# Patient Record
Sex: FEMALE | Race: WHITE | NOT HISPANIC OR LATINO
[De-identification: names, ages, dates, MRNs, and addresses within clinical notes are randomized per-mention and may not be internally consistent; named-entity substitution may affect disease eponyms.]

---

## 2017-04-06 ENCOUNTER — RECORDS - HEALTHEAST (OUTPATIENT)
Dept: ADMINISTRATIVE | Facility: OTHER | Age: 32
End: 2017-04-06

## 2017-04-11 ENCOUNTER — AMBULATORY - HEALTHEAST (OUTPATIENT)
Dept: FAMILY MEDICINE | Facility: CLINIC | Age: 32
End: 2017-04-11

## 2017-04-11 ENCOUNTER — AMBULATORY - HEALTHEAST (OUTPATIENT)
Dept: NURSING | Facility: CLINIC | Age: 32
End: 2017-04-11

## 2017-04-11 DIAGNOSIS — Z23 IMMUNIZATION DUE: ICD-10-CM

## 2017-04-14 ENCOUNTER — RECORDS - HEALTHEAST (OUTPATIENT)
Dept: ADMINISTRATIVE | Facility: OTHER | Age: 32
End: 2017-04-14

## 2017-04-24 ENCOUNTER — OFFICE VISIT - HEALTHEAST (OUTPATIENT)
Dept: FAMILY MEDICINE | Facility: CLINIC | Age: 32
End: 2017-04-24

## 2017-04-24 DIAGNOSIS — R17 ELEVATED BILIRUBIN: ICD-10-CM

## 2021-05-30 VITALS — WEIGHT: 149.5 LBS

## 2021-06-10 NOTE — PROGRESS NOTES
Assessment:  1.  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  #2.  Underlying infertility issue.    Plan: Check hepatic profile which includes indirect and direct bilirubin.  Did explain that this is very compatible with Gato syndrome and explained that condition to her and that there is no implication of effect on her life expectancy etc.  Spent at least 15 minutes with at least 50% in counseling.  If the hepatic profile is compatible with this, then no further workup is needed.    Subjective: 31-year-old female presenting for evaluation regarding elevated bilirubin on some blood tests that were done as a part of an infertility workup that she has been having at the [facility].  She is feeling fine.  No fever nausea vomiting, no abdominal pain, no weight change, no trouble with bowel movements or urination etc.  She did have a hysteroscopy done about 2 weeks ago and that was apparently normal.  She does not have any other past surgical history.  She had pneumonia as a child and apparently a brief trouble with depression as a child but she has not had any trouble with depression as an adult and does not have any history of bipolar disorder which had been put on her problem list apparently in error.  All other review of systems are negative.  She is not on any current medication and does not have any known allergies.  She has not noticed any yellow sclera in the past.    Objective:/60  Pulse 72  Wt 149 lb 8 oz (67.8 kg)  Alert female.  HEENT is negative.  Neck supple without adenopathy or thyromegaly.  Lungs clear.  Heart regular rate and rhythm without murmur.  Abdomen shows no masses tenderness or hepatosplenomegaly.  No pedal edema.  Skin is not remarkable.  There is no icterus on examining the sclera.    See the extensive lab work that she brings with that has been done as a part of the workup at the [facility].  She has normal cell count, normal  kidney and liver function except that she has the elevated total bilirubin.  There is not any indirect or direct fractionation on the lab test she has.  She had a test for CMV that was negative.  She was found to not be immune for rubella so she did have a rubella immunization.  That lab work will be scanned into the chart.

## 2021-06-15 PROBLEM — E80.4 GILBERT'S SYNDROME: Status: ACTIVE | Noted: 2017-04-25
